# Patient Record
Sex: MALE | Race: WHITE
[De-identification: names, ages, dates, MRNs, and addresses within clinical notes are randomized per-mention and may not be internally consistent; named-entity substitution may affect disease eponyms.]

---

## 2017-10-12 ENCOUNTER — HOSPITAL ENCOUNTER (EMERGENCY)
Dept: HOSPITAL 44 - ED | Age: 37
Discharge: HOME | End: 2017-10-12
Payer: COMMERCIAL

## 2017-10-12 VITALS
SYSTOLIC BLOOD PRESSURE: 132 MMHG | DIASTOLIC BLOOD PRESSURE: 83 MMHG | SYSTOLIC BLOOD PRESSURE: 132 MMHG | DIASTOLIC BLOOD PRESSURE: 83 MMHG
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
## 2017-10-12 DIAGNOSIS — S61.211A: Primary | ICD-10-CM
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
## 2017-10-12 PROCEDURE — 12001 RPR S/N/AX/GEN/TRNK 2.5CM/<: CPT

## 2017-10-12 PROCEDURE — 90471 IMMUNIZATION ADMIN: CPT

## 2017-10-12 PROCEDURE — 99283 EMERGENCY DEPT VISIT LOW MDM: CPT

## 2017-10-12 PROCEDURE — 90715 TDAP VACCINE 7 YRS/> IM: CPT

## 2017-10-12 NOTE — ED PHYSICIAN DOCUMENTATION
General Adult





- HISTORIAN


Historian: patient





- HPI


Stated Complaint: left index finger


Chief Complaint: Laceration/Recheck/Suture


Onset: hours (1)


Timing: still present


Severity: moderate


Further Comments: no


Last known Well Date: 10/12/17


Last Known Well Time: 16:00





- ROS


CONST: no problems


GI/: none


MS/SKIN/LYMPH: other (lacerataion )





- PAST HX


Past History: none


Other History: none


Surgeries/Procedures: none


Immunizations: tetanus


Allergies/Adverse Reactions: 


 Allergies











Allergy/AdvReac Type Severity Reaction Status Date / Time


 


No Known Allergies Allergy   Verified 10/12/17 16:08














Home Medications: 


 Ambulatory Orders











 Medication  Instructions  Recorded


 


NK [NK]  10/12/17














- SOCIAL HX


Smoking History: non-smoker


Alcohol Use: none


Drug Use: none





- FAMILY HX


Family History: No





- VITAL SIGNS


Vital Signs: 


 Vital Signs











Temp Pulse Resp BP Pulse Ox


 


    82   20   132/83   100 


 


    10/12/17 17:47  10/12/17 17:47  10/12/17 15:49  10/12/17 17:47














- REVIEWED ASSESSMENTS


Nursing Assessment  Reviewed: Yes


Vitals Reviewed: Yes





Procedures


Wound Location: upper extremity (left hand first finger )


Wound's Depth, Shape: superficial


Wound Explored: clean


Betadine Prep?: No (Hebacleanse )


Anesthesia: 2% Lidocaine


Volume of Anesthetic: 12 ml 


Wound Debrided: none 


Wound Repaired With: sutures


Suture Size/Type: 3:0


Number of Sutures: 4


Layer Closure?: No


Sterile Dressing Applied?: Yes


Splint Applied?: No


Sling Applied?: No


Progress: 





tolerated well 


FROM 


+ cap refill 


+ sensation 





ED Results Lab/Radiology





- Orders


Orders: 


 ED Orders











 Category Date Time Status


 


 Diph,Pertuss(Acell),Tet Vac/Pf [Adacel] Med  10/12/17 17:26 Discontinued





 0.5 ml IM .ONCE ONE   














General Adult Physical Exam





- PHYSICAL EXAM


GENERAL APPEARANCE: no distress


RESPIRATORY: no resp distress


CVS: reg rate & rhythm, heart sounds normal, equal pulses


SKIN: other (left hand first finger laceration approx 1.5 cm )


NEURO: oriented X3, CN's nml as tested, motor nml





Discharge


Clincal Impression: 


 Laceration





Referrals: 


Sunny Mccrary MD [Primary Care Provider] - 2 Days


Condition: Stable


Disposition: 01 HOME, SELF-CARE


Decision to Admit: NO


Date of Decison to Admit: 10/12/17


Decision Time: 17:27

## 2019-12-16 ENCOUNTER — HOSPITAL ENCOUNTER (OUTPATIENT)
Dept: HOSPITAL 44 - LAB | Age: 39
End: 2019-12-16
Attending: FAMILY MEDICINE
Payer: COMMERCIAL

## 2019-12-16 DIAGNOSIS — R10.33: Primary | ICD-10-CM

## 2019-12-16 LAB
BASOPHILS NFR BLD: 0.5 % (ref 0–1.5)
EGFR (NON-AFRICAN): > 60
MCV RBC AUTO: 87 FL (ref 80–100)
NEUTROPHILS #: 6.9 # K/UL (ref 1.4–7.7)

## 2019-12-16 PROCEDURE — 80053 COMPREHEN METABOLIC PANEL: CPT

## 2019-12-16 PROCEDURE — 36415 COLL VENOUS BLD VENIPUNCTURE: CPT

## 2019-12-16 PROCEDURE — 85025 COMPLETE CBC W/AUTO DIFF WBC: CPT

## 2019-12-16 PROCEDURE — 83690 ASSAY OF LIPASE: CPT
